# Patient Record
Sex: FEMALE | Race: WHITE | Employment: OTHER | URBAN - METROPOLITAN AREA
[De-identification: names, ages, dates, MRNs, and addresses within clinical notes are randomized per-mention and may not be internally consistent; named-entity substitution may affect disease eponyms.]

---

## 2017-08-02 ENCOUNTER — HOSPITAL ENCOUNTER (OUTPATIENT)
Dept: MAMMOGRAPHY | Age: 67
Discharge: HOME | End: 2017-08-02
Attending: FAMILY MEDICINE | Admitting: FAMILY MEDICINE

## 2017-08-02 DIAGNOSIS — Z12.31 VISIT FOR SCREENING MAMMOGRAM: ICD-10-CM

## 2018-05-16 ENCOUNTER — HOSPITAL ENCOUNTER (OUTPATIENT)
Dept: MAMMOGRAPHY | Age: 68
Discharge: HOME | End: 2018-05-16
Admitting: OBSTETRICS & GYNECOLOGY

## 2018-05-16 DIAGNOSIS — N64.4 MASTODYNIA: ICD-10-CM

## 2018-05-16 DIAGNOSIS — Z12.31 VISIT FOR SCREENING MAMMOGRAM: ICD-10-CM

## 2018-08-17 ENCOUNTER — HOSPITAL ENCOUNTER (EMERGENCY)
Age: 68
Discharge: HOME | End: 2018-08-17
Attending: EMERGENCY MEDICINE
Payer: MEDICARE

## 2018-08-17 VITALS
BODY MASS INDEX: 28.45 KG/M2 | HEIGHT: 66 IN | HEART RATE: 69 BPM | SYSTOLIC BLOOD PRESSURE: 153 MMHG | WEIGHT: 177 LBS | DIASTOLIC BLOOD PRESSURE: 85 MMHG | OXYGEN SATURATION: 96 % | TEMPERATURE: 98.2 F | RESPIRATION RATE: 17 BRPM

## 2018-08-17 DIAGNOSIS — T63.441A ALLERGIC REACTION TO BEE STING: Primary | ICD-10-CM

## 2018-08-17 PROCEDURE — 96361 HYDRATE IV INFUSION ADD-ON: CPT

## 2018-08-17 PROCEDURE — 96375 TX/PRO/DX INJ NEW DRUG ADDON: CPT

## 2018-08-17 PROCEDURE — 96374 THER/PROPH/DIAG INJ IV PUSH: CPT

## 2018-08-17 PROCEDURE — 6360000002 HC RX W HCPCS: Performed by: PHYSICIAN ASSISTANT

## 2018-08-17 PROCEDURE — 99282 EMERGENCY DEPT VISIT SF MDM: CPT

## 2018-08-17 PROCEDURE — S0028 INJECTION, FAMOTIDINE, 20 MG: HCPCS | Performed by: PHYSICIAN ASSISTANT

## 2018-08-17 PROCEDURE — 2500000003 HC RX 250 WO HCPCS: Performed by: PHYSICIAN ASSISTANT

## 2018-08-17 PROCEDURE — 2580000003 HC RX 258: Performed by: PHYSICIAN ASSISTANT

## 2018-08-17 RX ORDER — PREDNISONE 10 MG/1
TABLET ORAL
Qty: 20 TABLET | Refills: 0 | Status: SHIPPED | OUTPATIENT
Start: 2018-08-17 | End: 2018-08-27

## 2018-08-17 RX ORDER — FLUTICASONE PROPIONATE 50 MCG
1 SPRAY, SUSPENSION (ML) NASAL DAILY PRN
COMMUNITY
Start: 2018-06-05

## 2018-08-17 RX ORDER — DIPHENHYDRAMINE HCL 25 MG
25 CAPSULE ORAL EVERY 6 HOURS PRN
Qty: 20 CAPSULE | Refills: 0 | Status: SHIPPED | OUTPATIENT
Start: 2018-08-17 | End: 2018-08-22

## 2018-08-17 RX ORDER — METHYLPREDNISOLONE SODIUM SUCCINATE 125 MG/2ML
125 INJECTION, POWDER, LYOPHILIZED, FOR SOLUTION INTRAMUSCULAR; INTRAVENOUS ONCE
Status: COMPLETED | OUTPATIENT
Start: 2018-08-17 | End: 2018-08-17

## 2018-08-17 RX ORDER — 0.9 % SODIUM CHLORIDE 0.9 %
1000 INTRAVENOUS SOLUTION INTRAVENOUS ONCE
Status: COMPLETED | OUTPATIENT
Start: 2018-08-17 | End: 2018-08-17

## 2018-08-17 RX ORDER — LORATADINE 10 MG/1
1 CAPSULE, LIQUID FILLED ORAL DAILY
COMMUNITY

## 2018-08-17 RX ORDER — EPINEPHRINE 0.3 MG/.3ML
0.3 INJECTION SUBCUTANEOUS ONCE
Qty: 1 EACH | Refills: 0 | Status: SHIPPED | OUTPATIENT
Start: 2018-08-17 | End: 2021-09-21 | Stop reason: SDUPTHER

## 2018-08-17 RX ORDER — EPINEPHRINE 0.3 MG/.3ML
1 INJECTION SUBCUTANEOUS PRN
COMMUNITY
Start: 2017-06-03 | End: 2018-08-17

## 2018-08-17 RX ORDER — DIPHENHYDRAMINE HYDROCHLORIDE 50 MG/ML
25 INJECTION INTRAMUSCULAR; INTRAVENOUS ONCE
Status: COMPLETED | OUTPATIENT
Start: 2018-08-17 | End: 2018-08-17

## 2018-08-17 RX ORDER — FAMOTIDINE 20 MG/1
40 TABLET, FILM COATED ORAL DAILY
Qty: 10 TABLET | Refills: 0 | Status: SHIPPED | OUTPATIENT
Start: 2018-08-17 | End: 2018-08-22

## 2018-08-17 RX ORDER — ESTRADIOL 0.1 MG/G
2 CREAM VAGINAL NIGHTLY
COMMUNITY

## 2018-08-17 RX ADMIN — METHYLPREDNISOLONE SODIUM SUCCINATE 125 MG: 125 INJECTION, POWDER, FOR SOLUTION INTRAMUSCULAR; INTRAVENOUS at 15:44

## 2018-08-17 RX ADMIN — DIPHENHYDRAMINE HYDROCHLORIDE 25 MG: 50 INJECTION, SOLUTION INTRAMUSCULAR; INTRAVENOUS at 16:00

## 2018-08-17 RX ADMIN — SODIUM CHLORIDE 1000 ML: 9 INJECTION, SOLUTION INTRAVENOUS at 15:45

## 2018-08-17 RX ADMIN — FAMOTIDINE 20 MG: 10 INJECTION, SOLUTION INTRAVENOUS at 15:44

## 2018-08-17 ASSESSMENT — PAIN DESCRIPTION - LOCATION: LOCATION: KNEE;WRIST

## 2018-08-17 ASSESSMENT — ENCOUNTER SYMPTOMS
SHORTNESS OF BREATH: 0
DIARRHEA: 0
NAUSEA: 0
CHEST TIGHTNESS: 0
VOMITING: 0
ABDOMINAL PAIN: 0
WHEEZING: 0

## 2018-08-17 ASSESSMENT — PAIN SCALES - GENERAL: PAINLEVEL_OUTOF10: 8

## 2018-08-17 ASSESSMENT — PAIN DESCRIPTION - PAIN TYPE: TYPE: ACUTE PAIN

## 2018-08-17 ASSESSMENT — PAIN DESCRIPTION - ORIENTATION: ORIENTATION: POSTERIOR

## 2018-08-17 NOTE — ED PROVIDER NOTES
.    [facility]  EMERGENCY DEPARTMENT ENCOUNTER          PHYSICIAN ASSISTANT NOTE       Date of evaluation: 8/17/2018    Chief Complaint     Allergic Reaction (Too Bees, took epi and benadryl prior to arrival. )      History of Present Illness     SHAVON Rodriguez is a 75 y.o. female who presents to the emergency department for Allergic reaction. This afternoon at 2:45 she was stung by 2 bees, once on her left forearm, and once on the back of her left leg. 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 She took her EpiPen, as well as one pill of 25 mg of Benadryl. She developed localized swelling on her arm and leg, with no shortness of breath, no swelling of her mouth or tongue, no shortness of breath or chest pain, no nausea, vomiting or abdominal discomfort. Upon presentation to the ED, she admits to slight lightheadedness without dizziness. Lightheadedness is not positional.  Symptoms were gradual in onset. Review of Systems     Review of Systems   Constitutional: Negative for chills, diaphoresis, fatigue and fever. Respiratory: Negative for chest tightness, shortness of breath and wheezing. Gastrointestinal: Negative for abdominal pain, diarrhea, nausea and vomiting. Skin: Negative for pallor and rash. Neurological: Positive for light-headedness. Negative for dizziness, weakness and headaches. Past Medical, Surgical, Family, and Social History     She has a past medical history of Hypertension. She has a past surgical history that includes partial hysterectomy (cervix not removed); Appendectomy; and Finger trigger release. Her family history is not on file. She reports that she has never smoked. She has never used smokeless tobacco. She reports that she drinks alcohol. She reports that she does not use drugs.     Medications     Previous Medications    ATENOLOL (TENORMIN) 25 MG TABLET    Take 25 mg by mouth daily    CALCIUM CARBONATE (OSCAL) 500 MG TABS TABLET Take 500 mg by mouth daily    ESTRADIOL (ESTRACE) 0.1 MG/GM VAGINAL CREAM    Place 2 g vaginally nightly    FLUTICASONE (FLONASE) 50 MCG/ACT NASAL SPRAY    1 spray by Nasal route daily as needed    HYDROCORTISONE 2.5 % CREAM    2 times daily as needed    LORATADINE (CLARITIN) 10 MG CAPSULE    Take 1 capsule by mouth daily    VITAMIN D (CHOLECALCIFEROL) 1000 UNIT TABS TABLET    Take 1,000 Units by mouth daily    VITAMIN D 1000 UNITS CAPS    Take 1 capsule by mouth daily       Allergies     She is allergic to bee venom; fexofenadine; ibuprofen; metronidazole; shellfish-derived products; anesthetics, amide; and macrolides and ketolides. Physical Exam     INITIAL VITALS: BP: (!) 147/78, Temp: 98.2 °F (36.8 °C), Pulse: 81, Resp: 18, SpO2: 100 %   Physical Exam   Constitutional: She appears well-developed and well-nourished. No distress. HENT:   Head: Normocephalic and atraumatic. Mouth/Throat: Oropharynx is clear and moist.   No swelling or lips or tongue, uvula is midline and not enlarged   Eyes: Pupils are equal, round, and reactive to light. EOM are normal.   Neck: Normal range of motion. Cardiovascular: Normal rate, regular rhythm, normal heart sounds and intact distal pulses. Exam reveals no gallop and no friction rub. No murmur heard. Pulmonary/Chest: Effort normal and breath sounds normal. No respiratory distress. She has no wheezes. Abdominal: Soft. There is no tenderness. Musculoskeletal: Normal range of motion. She exhibits no edema. Neurological: She is alert. Skin: Skin is warm and dry. She is not diaphoretic. Psychiatric: She has a normal mood and affect. Her behavior is normal.   Nursing note and vitals reviewed. Diagnostic Results     RADIOLOGY:  No orders to display       LABS:   No results found for this visit on 08/17/18.       RECENT VITALS:  BP: (!) 150/74, Temp: 98.2 °F (36.8 °C), Pulse: 68, Resp: 14, SpO2: 98 %     Procedures     None    ED Course     Nursing Notes, Past Medical Hx, Past Surgical Hx, Social Hx, Allergies, and Family Hx were reviewed. The patient was given the following medications:  Orders Placed This Encounter   Medications    methylPREDNISolone sodium (SOLU-MEDROL) injection 125 mg    famotidine (PEPCID) injection 20 mg    0.9 % sodium chloride bolus    diphenhydrAMINE (BENADRYL) injection 25 mg    EPINEPHrine (EPIPEN 2-ERASMO) 0.3 MG/0.3ML SOAJ injection     Sig: Inject 0.3 mLs into the muscle once for 1 dose     Dispense:  1 each     Refill:  0     May supplement with generic brand    predniSONE (DELTASONE) 10 MG tablet     Sig: Take 4 tablets by mouth once daily for 5 days     Dispense:  20 tablet     Refill:  0    famotidine (PEPCID) 20 MG tablet     Sig: Take 2 tablets by mouth daily for 5 days     Dispense:  10 tablet     Refill:  0    diphenhydrAMINE (BENADRYL) 25 MG capsule     Sig: Take 1 capsule by mouth every 6 hours as needed for Itching     Dispense:  20 capsule     Refill:  0       CONSULTS:  None    MEDICAL DECISION MAKING / ASSESSMENT / Claude Burrows is a 75 y.o. female presenting to the emergency department for evaluation after being stung by a bee. 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 Bee sting was at 245, she subsequently developed mild localized swelling and erythema on her left forearm and the back of her left leg. She took her EpiPen as well as one dose of 25 mg of Benadryl. Upon presentation, she has no swelling of her mouth or tongue. She has no airway compromise. She did admit to a small amount of lightheadedness, but remained hemodynamically stable and afebrile. She was given a 1 L fluid bolus as well as IV Pepcid and methylprednisolone. She was also given 25 mg of Benadryl IV. Plan to monitor patient for developing symptoms until 17:30.  At this time, I'm going off service, my attending will follow-up on further treatment of symptoms with likely discharge home if patient remains

## 2018-08-17 NOTE — ED PROVIDER NOTES
ED Attending Attestation Note     Date of evaluation: 8/17/2018    This patient was seen by the advance practice provider. I have seen and examined the patient, agree with the workup, evaluation, management and diagnosis. The care plan has been discussed. My assessment reveals a 75 YOF with a PMHx of HTN and bee-sting allergy who presented after being stung by a presumed bee at approximately 2:45 PM on her left wrist and behind her left knee. She used her EPI pen and took benadryl and came to the ED. On my evaluation she is well appearing with clear lungs, no stridor, and not hypotensive. Her abdomen is benign. She has a GCS of 15. Will treat with Pepcid, Diphenhydramine, Solumedrol and IVF. Will observe in the ED for several hours and refill EPI pen. No evidence of retained stingers.         Lexii Zimmerman MD  08/17/18 [id number]

## 2019-05-22 ENCOUNTER — HOSPITAL ENCOUNTER (OUTPATIENT)
Dept: MAMMOGRAPHY | Age: 69
Discharge: HOME | End: 2019-05-22
Payer: MEDICARE

## 2019-05-22 DIAGNOSIS — Z12.31 VISIT FOR SCREENING MAMMOGRAM: ICD-10-CM

## 2019-05-22 PROCEDURE — 77067 SCR MAMMO BI INCL CAD: CPT

## 2019-05-22 PROCEDURE — 77063 BREAST TOMOSYNTHESIS BI: CPT

## 2021-05-08 ENCOUNTER — HOSPITAL ENCOUNTER (EMERGENCY)
Age: 71
Discharge: HOME | End: 2021-05-08
Attending: STUDENT IN AN ORGANIZED HEALTH CARE EDUCATION/TRAINING PROGRAM
Payer: MEDICARE

## 2021-05-08 VITALS
TEMPERATURE: 98.7 F | OXYGEN SATURATION: 100 % | SYSTOLIC BLOOD PRESSURE: 175 MMHG | DIASTOLIC BLOOD PRESSURE: 85 MMHG | HEART RATE: 86 BPM | RESPIRATION RATE: 18 BRPM

## 2021-05-08 DIAGNOSIS — T63.441A BEE STING, ACCIDENTAL OR UNINTENTIONAL, INITIAL ENCOUNTER: Primary | ICD-10-CM

## 2021-05-08 PROCEDURE — 6360000002 HC RX W HCPCS: Performed by: STUDENT IN AN ORGANIZED HEALTH CARE EDUCATION/TRAINING PROGRAM

## 2021-05-08 PROCEDURE — 2580000003 HC RX 258: Performed by: STUDENT IN AN ORGANIZED HEALTH CARE EDUCATION/TRAINING PROGRAM

## 2021-05-08 PROCEDURE — 96374 THER/PROPH/DIAG INJ IV PUSH: CPT

## 2021-05-08 PROCEDURE — 2500000003 HC RX 250 WO HCPCS: Performed by: STUDENT IN AN ORGANIZED HEALTH CARE EDUCATION/TRAINING PROGRAM

## 2021-05-08 PROCEDURE — 96375 TX/PRO/DX INJ NEW DRUG ADDON: CPT

## 2021-05-08 PROCEDURE — 99283 EMERGENCY DEPT VISIT LOW MDM: CPT

## 2021-05-08 RX ORDER — METHYLPREDNISOLONE SODIUM SUCCINATE 125 MG/2ML
125 INJECTION, POWDER, LYOPHILIZED, FOR SOLUTION INTRAMUSCULAR; INTRAVENOUS ONCE
Status: COMPLETED | OUTPATIENT
Start: 2021-05-08 | End: 2021-05-08

## 2021-05-08 RX ORDER — 0.9 % SODIUM CHLORIDE 0.9 %
500 INTRAVENOUS SOLUTION INTRAVENOUS ONCE
Status: COMPLETED | OUTPATIENT
Start: 2021-05-08 | End: 2021-05-08

## 2021-05-08 RX ADMIN — METHYLPREDNISOLONE SODIUM SUCCINATE 125 MG: 125 INJECTION, POWDER, FOR SOLUTION INTRAMUSCULAR; INTRAVENOUS at 19:13

## 2021-05-08 RX ADMIN — SODIUM CHLORIDE 500 ML: 900 INJECTION, SOLUTION INTRAVENOUS at 19:13

## 2021-05-08 RX ADMIN — FAMOTIDINE 20 MG: 10 INJECTION, SOLUTION INTRAVENOUS at 19:13

## 2021-05-08 ASSESSMENT — ENCOUNTER SYMPTOMS
NAUSEA: 0
SHORTNESS OF BREATH: 0
ABDOMINAL PAIN: 0
RHINORRHEA: 0
COUGH: 0
EYE DISCHARGE: 0
COLOR CHANGE: 1
EYE REDNESS: 0
SORE THROAT: 0
VOMITING: 0

## 2021-05-08 NOTE — ED PROVIDER NOTES
[address]          ATTENDING PHYSICIAN NOTE       Date of evaluation: 5/8/2021    Chief Complaint     Allergic Reaction (bee sting)      History of Present Illness     ELISSA Coles is a 69 y.o. female with hx of bee allergy presents to the ED for a bee sting to the left arm. It occurred approximately 45 minutes prior to arrival.  Patient administered her Epipen and took 50 mg of PO benadryl prior to arrival.  She denies shortness of breath, chest pain, abd pain, n/v, diarrhea. She says she \"feels flushed. \"    Review of Systems     Review of Systems   Constitutional: Negative for chills and fever. HENT: Negative for rhinorrhea and sore throat. Eyes: Negative for discharge and redness. Respiratory: Negative for cough and shortness of breath. Cardiovascular: Negative for chest pain and leg swelling. Gastrointestinal: Negative for abdominal pain, nausea and vomiting. Genitourinary: Negative for dysuria and hematuria. Musculoskeletal: Negative for arthralgias and myalgias. Skin: Positive for color change. Negative for rash. Neurological: Negative for light-headedness and headaches. Psychiatric/Behavioral: Negative for agitation and confusion. Past Medical, Surgical, Family, and Social History     She has a past medical history of Cancer [facility]), Hypertension, and Kidney stone. She has a past surgical history that includes partial hysterectomy (cervix not removed); Appendectomy; and Finger trigger release. Her family history is not on file. She reports that she has never smoked. She has never used smokeless tobacco. She reports current alcohol use. She reports that she does not use drugs.     Medications     Previous Medications    ATENOLOL (TENORMIN) 25 MG TABLET    Take 25 mg by mouth daily    CALCIUM CARBONATE (OSCAL) 500 MG TABS TABLET    Take 500 mg by mouth daily    EPINEPHRINE (EPIPEN 2-ERASMO) 0.3 MG/0.3ML SOAJ INJECTION    Inject 0.3 LABS:   No results found for this visit on 05/08/21. RECENT VITALS:  BP: (!) 175/85,Temp: 98.7 °F (37.1 °C), Pulse: 86, Resp: 18, SpO2: 100 %       ED Course     Nursing Notes, Past Medical Hx, Past Surgical Hx, Social Hx,Allergies, and Family Hx were reviewed. patient was given the following medications:  Orders Placed This Encounter   Medications    methylPREDNISolone sodium (SOLU-MEDROL) injection 125 mg    famotidine (PEPCID) injection 20 mg    0.9 % sodium chloride bolus       CONSULTS:  None    MEDICAL DECISIONMAKING / ASSESSMENT / Gio Jluis is a 69 y.o. female with hx of bee allergy presents to the ED after a bee sting. Patient administered Epipen and took 50 mg PO benadryl prior to arrival.  ABCs intact, mildly hypertensive 175/85 other vitals stable. Patient in no wale distress. No tongue swelling, stridor, or concern for airway compromise. IV access established and patient given IV pepcid, solumedrol, and fluid bolus. She was observed in the ED for 2 hours with improvement of BP. At that time, patient says her symptoms had improved and she requested discharge home. ED precautions for worsening symptoms. Clinical Impression     1.  Bee sting, accidental or unintentional, initial encounter        Disposition     PATIENT REFERRED TO:  [id number] [facility],[address] [phone]    Schedule an appointment as soon as possible for a visit       [facility]  [address] [place] [address]  [phone]    If symptoms worsen      DISCHARGE MEDICATIONS:  New Prescriptions    No medications on file       DISPOSITION Decision To Discharge 05/08/2021 08:41:05 PM       Annabella Kenney MD  05/08/21 [id number]

## 2021-05-09 NOTE — ED NOTES
Patient discharged to home via family. Written discharge instructions reviewed with understanding. Copy of AVS sent home with patient. Patient able to walk from ED without assistance.        Nazanin Rose RN  05/08/21 3651

## 2021-09-21 ENCOUNTER — HOSPITAL ENCOUNTER (EMERGENCY)
Age: 71
Discharge: HOME | End: 2021-09-21
Attending: STUDENT IN AN ORGANIZED HEALTH CARE EDUCATION/TRAINING PROGRAM
Payer: MEDICARE

## 2021-09-21 VITALS
WEIGHT: 179 LBS | SYSTOLIC BLOOD PRESSURE: 166 MMHG | HEIGHT: 66 IN | OXYGEN SATURATION: 98 % | TEMPERATURE: 97.5 F | BODY MASS INDEX: 28.77 KG/M2 | RESPIRATION RATE: 13 BRPM | HEART RATE: 77 BPM | DIASTOLIC BLOOD PRESSURE: 94 MMHG

## 2021-09-21 DIAGNOSIS — T78.40XA ALLERGIC REACTION, INITIAL ENCOUNTER: Primary | ICD-10-CM

## 2021-09-21 PROCEDURE — 99283 EMERGENCY DEPT VISIT LOW MDM: CPT

## 2021-09-21 PROCEDURE — 6370000000 HC RX 637 (ALT 250 FOR IP): Performed by: STUDENT IN AN ORGANIZED HEALTH CARE EDUCATION/TRAINING PROGRAM

## 2021-09-21 RX ORDER — PREDNISONE 20 MG/1
20 TABLET ORAL DAILY
Qty: 4 TABLET | Refills: 0 | Status: SHIPPED | OUTPATIENT
Start: 2021-09-21 | End: 2021-09-25

## 2021-09-21 RX ORDER — FAMOTIDINE 20 MG/1
20 TABLET, FILM COATED ORAL ONCE
Status: COMPLETED | OUTPATIENT
Start: 2021-09-21 | End: 2021-09-21

## 2021-09-21 RX ORDER — DIPHENHYDRAMINE HCL 25 MG
25 CAPSULE ORAL EVERY 6 HOURS PRN
Qty: 1 CAPSULE | Refills: 0 | Status: SHIPPED | OUTPATIENT
Start: 2021-09-21 | End: 2021-10-01

## 2021-09-21 RX ORDER — DIPHENHYDRAMINE HCL 25 MG
25 TABLET ORAL ONCE
Status: COMPLETED | OUTPATIENT
Start: 2021-09-21 | End: 2021-09-21

## 2021-09-21 RX ORDER — EPINEPHRINE 0.3 MG/.3ML
0.3 INJECTION SUBCUTANEOUS ONCE
Qty: 1 EACH | Refills: 0 | Status: SHIPPED | OUTPATIENT
Start: 2021-09-21 | End: 2021-09-21

## 2021-09-21 RX ORDER — PREDNISONE 20 MG/1
40 TABLET ORAL ONCE
Status: COMPLETED | OUTPATIENT
Start: 2021-09-21 | End: 2021-09-21

## 2021-09-21 RX ADMIN — PREDNISONE 40 MG: 20 TABLET ORAL at 12:52

## 2021-09-21 RX ADMIN — FAMOTIDINE 20 MG: 20 TABLET ORAL at 12:52

## 2021-09-21 RX ADMIN — DIPHENHYDRAMINE HYDROCHLORIDE 25 MG: 25 TABLET, FILM COATED ORAL at 16:20

## 2021-09-21 ASSESSMENT — PAIN DESCRIPTION - ORIENTATION: ORIENTATION: LEFT

## 2021-09-21 ASSESSMENT — PAIN DESCRIPTION - PAIN TYPE: TYPE: ACUTE PAIN

## 2021-09-21 ASSESSMENT — PAIN DESCRIPTION - DESCRIPTORS: DESCRIPTORS: BURNING

## 2021-09-21 ASSESSMENT — PAIN SCALES - GENERAL: PAINLEVEL_OUTOF10: 6

## 2021-09-21 NOTE — ED PROVIDER NOTES
swelling, joint redness    Past Medical, Surgical, Family, and Social History     She has a past medical history of Cancer ([address] Hypertension, and Kidney stone. She has a past surgical history that includes partial hysterectomy (cervix not removed); Appendectomy; and Finger trigger release. Her family history is not on file. She reports that she has never smoked. She has never used smokeless tobacco. She reports current alcohol use. She reports that she does not use drugs. Medications     Discharge Medication List as of 9/21/2021  4:13 PM      CONTINUE these medications which have NOT CHANGED    Details   vitamin D 1000 units CAPS Take 1 capsule by mouth dailyHistorical Med      estradiol (ESTRACE) 0.1 MG/GM vaginal cream Place 2 g vaginally nightlyHistorical Med      fluticasone (FLONASE) 50 MCG/ACT nasal spray 1 spray by Nasal route daily as neededHistorical Med      hydrocortisone 2.5 % cream 2 times daily as needed, 2 TIMES DAILY PRN Starting Tue 6/5/2018, Historical Med      loratadine (CLARITIN) 10 MG capsule Take 1 capsule by mouth dailyHistorical Med      famotidine (PEPCID) 20 MG tablet Take 2 tablets by mouth daily for 5 days, Disp-10 tablet, R-0Print      atenolol (TENORMIN) 25 MG tablet Take 25 mg by mouth daily      vitamin D (CHOLECALCIFEROL) 1000 UNIT TABS tablet Take 1,000 Units by mouth daily      calcium carbonate (OSCAL) 500 MG TABS tablet Take 500 mg by mouth daily             Allergies     She is allergic to bee venom; fexofenadine; ibuprofen; metronidazole; shellfish-derived products; anesthetics, amide; and macrolides and ketolides. Physical Exam     INITIAL VITALS: BP: (!) 160/83, Temp: 97.5 °F (36.4 °C), Pulse: 100, Resp: 14, SpO2: 99 %     General:  Well appearing. No acute distress. Non-toxic appearing    Eyes:  Pupils equally round, reactive, brisk. No discharge from eyes.    ENT: The external ears are normal.   The external nose is unremarkable, and there is no significant nasal or ear drainage on exam. The mucosa are moist. The uvula is midline, elevates in the midline, and is free of edema. There are no tonsillar exudates. The submandibular space is soft and free of significant swelling. There is no drooling, dysphonia, pooled secretions, stridor, or active bleeding. Neck:  Supple. Nontender. Pulmonary:   Non-labored breathing. Breath sounds clear bilaterally. No wheezing. Cardiac:  Regular rhythm, normal rate (80s on my exam). No murmurs. Abdomen:  Soft. Non-tender throughout. Non-distended. No masses. Musculoskeletal:  No long bone deformity. No ankle or wrist deformity. Vascular:  Extremities warm and perfused. Radial pulses 2+ bilaterally. Dorsalis pedis pulses 2+ bilaterally. Skin:  No rash. Warm. Bee sting just inferior to L breast with 2cm circular area of raised erythema. At left posterior back, scattered mild wheals. Neuro: Alert and oriented x 3. CN II-XII grossly intact. Speech and mentation normal.   HOLLIE. Sensation grossly intact to light touch. Extremities:  No peripheral edema. LE symmetric. Diagnostic Results     EKG   none    RADIOLOGY:  No orders to display       LABS:   No results found for this visit on 09/21/21. ED BEDSIDE ULTRASOUND:  None performed    Procedures     None performed    ED Course     Nursing Notes, Past Medical Hx, Past Surgical Hx, Social Hx, Allergies, and Family Hx were reviewed.     The patient was given the following medications:  Orders Placed This Encounter   Medications    predniSONE (DELTASONE) tablet 40 mg    famotidine (PEPCID) tablet 20 mg    predniSONE (DELTASONE) 20 MG tablet     Sig: Take 1 tablet by mouth daily for 4 days     Dispense:  4 tablet     Refill:  0    diphenhydrAMINE (BENADRYL) 25 MG capsule     Sig: Take 1 capsule by mouth every 6 hours as needed for Itching     Dispense:  1 capsule     Refill:  0    EPINEPHrine (EPIPEN 2-ERASMO) 0.3 MG/0.3ML SOAJ injection     Sig: Inject 0.3 mLs into the muscle once for 1 dose     Dispense:  1 each     Refill:  0     May supplement with generic brand    diphenhydrAMINE (BENADRYL) tablet 25 mg       CONSULTS:  None    MEDICAL DECISIONMAKING / ASSESSMENT / Enrique Sparrow is a 69 y.o. female with allergic reaction s/p epi pen. Pt was hemodynamically stable and afebrile in the Emergency Department. On initial exam, she did have rash and had reported throat swelling at home so I did consider this to be possibly anaphylaxis or at least anaphylactoid. However, her exam here is almost completely reassuring with only a faint rash. Her oropharynx is without any swelling whatsoever. She has no hypotension. She is able to take pills. We will monitor her for 4 hours. On reassessment reveal is free of edema, there is no wheezing, her belly is soft and benign. There is been no nausea or vomiting. She has tolerated her medications and without difficulty. There is been no hypotension or tachycardia. We will refill her EpiPen, give her a short course of steroids, encourage Benadryl. Clinical Impression     1. Allergic reaction, initial encounter        Disposition     PATIENT REFERRED TO:  No follow-up provider specified.     DISCHARGE MEDICATIONS:  Discharge Medication List as of 9/21/2021  4:13 PM      START taking these medications    Details   predniSONE (DELTASONE) 20 MG tablet Take 1 tablet by mouth daily for 4 days, Disp-4 tablet, R-0Print      diphenhydrAMINE (BENADRYL) 25 MG capsule Take 1 capsule by mouth every 6 hours as needed for Itching, Disp-1 capsule, R-0Print             DISPOSITION Decision To Discharge 09/21/2021 04:22:32 PM       Carlee Gann MD  09/21/21 8732

## 2022-08-05 ENCOUNTER — HOSPITAL ENCOUNTER (OUTPATIENT)
Dept: MAMMOGRAPHY | Age: 72
Discharge: HOME | End: 2022-08-05
Payer: MEDICARE

## 2022-08-05 VITALS — BODY MASS INDEX: 29.41 KG/M2 | WEIGHT: 183 LBS | HEIGHT: 66 IN

## 2022-08-05 DIAGNOSIS — Z12.31 VISIT FOR SCREENING MAMMOGRAM: ICD-10-CM

## 2022-08-05 PROCEDURE — 77063 BREAST TOMOSYNTHESIS BI: CPT

## 2023-07-10 NOTE — ED NOTES
Bed: B20-20  Expected date:   Expected time:   Means of arrival:   Comments:  [address], RN  09/21/21 [id number] 65 year old female PMH HTN, Mitral Valve Prolapse, IBS, Prediabetes, Anxiety, Emphysema, COPD, Neck Pain, Migraines, Acid Reflux, Hiatal Hernia, Arnie's Esophagus, Other Specified Conditions Associated With Female Genital organs & Menstrual Cycle; now with Traumatic Rupture LEFT Ulnar Collateral Ligament Initial Encounter; presents today for PST prior to Repair LEFT Index Right Radial Collateral Ligament Rupture Utilizing Fluoroscopy with Dr Claudio Rosales on 7/20/23.     Pt notes "Back in January on the 30th I was moving fried chicken out of the frying pan onto the plate and I guess the plate got heavy and I ruptured my LEFT Index Ligament." Pt notes she went to her primary a few days later and had an xray which she notes didn't really show anything. Pt notes she had  continued discomfort and swelling LEFT Index finger knuckle area. Pt notes she first went to [facility] and was told she "had a bad sprain and I got a Cortisone shot." Pt then continued with discomfort and went to a different doctor in that practice who sent her for an MRI. Pt got DX but was told they "usually don't do surgery on those things they will heal on their own." Pt notes  pain continued so she went for consult with Dr Rosales. pt had follow up MRI which she notes shows rupture. Rates pain #9/10 on pain scale. Notes decreased ROM , decreased ability to bend fist, decreased ability to grab and lift things. Following discussions with Dr Rosales regarding treatment options pt is electing for scheduled procedure.  65 year old female PMH HTN, Mitral Valve Prolapse, IBS, Prediabetes, Anxiety, Emphysema, COPD, Neck Pain, Migraines, Acid Reflux, Hiatal Hernia, Arnie's Esophagus, Other Specified Conditions Associated With Female Genital organs & Menstrual Cycle; now with Traumatic Rupture LEFT Ulnar Collateral Ligament Initial Encounter; presents today for PST prior to Repair LEFT Index Right Radial Collateral Ligament Rupture Utilizing Fluoroscopy with Dr Claudio Rosales on 7/20/23.     Pt notes "Back in January on the 30th I was moving fried chicken out of the frying pan onto the plate and I guess the plate got heavy and I ruptured my LEFT Index Ligament." Pt notes she went to her primary a few days later and had an xray which she notes didn't really show anything. Pt notes she had  continued discomfort and swelling LEFT Index finger knuckle area. Pt notes she first went to [facility] and was told she "had a bad sprain and I got a Cortisone shot." Pt then continued with discomfort and went to a different doctor in that practice who sent her for an MRI. Pt got DX but was told they "usually don't do surgery on those things they will heal on their own." Pt notes  pain continued so she went for consult with Dr Rosales. Pt had follow up MRI which she notes shows rupture. Rates pain #9/10 on pain scale. Notes decreased ROM , decreased ability to bend fist, decreased ability to grab and lift things. Following discussions with Dr Rosales regarding treatment options pt is electing for scheduled procedure.

## 2023-09-18 ENCOUNTER — HOSPITAL ENCOUNTER (OUTPATIENT)
Dept: MAMMOGRAPHY | Age: 73
Discharge: HOME | End: 2023-09-18
Payer: MEDICARE

## 2023-09-18 VITALS — HEIGHT: 66 IN | BODY MASS INDEX: 29.41 KG/M2 | WEIGHT: 183 LBS

## 2023-09-18 DIAGNOSIS — Z12.31 VISIT FOR SCREENING MAMMOGRAM: ICD-10-CM

## 2023-09-18 PROCEDURE — 77063 BREAST TOMOSYNTHESIS BI: CPT

## 2024-08-10 ENCOUNTER — HOSPITAL ENCOUNTER (EMERGENCY)
Age: 74
Discharge: HOME | End: 2024-08-10
Attending: EMERGENCY MEDICINE
Payer: MEDICARE

## 2024-08-10 VITALS
RESPIRATION RATE: 18 BRPM | HEIGHT: 65 IN | DIASTOLIC BLOOD PRESSURE: 98 MMHG | OXYGEN SATURATION: 98 % | TEMPERATURE: 97.8 F | HEART RATE: 70 BPM | BODY MASS INDEX: 30.16 KG/M2 | WEIGHT: 181 LBS | SYSTOLIC BLOOD PRESSURE: 169 MMHG

## 2024-08-10 DIAGNOSIS — T78.40XA ALLERGIC REACTION, INITIAL ENCOUNTER: Primary | ICD-10-CM

## 2024-08-10 PROCEDURE — 6370000000 HC RX 637 (ALT 250 FOR IP)

## 2024-08-10 PROCEDURE — 96372 THER/PROPH/DIAG INJ SC/IM: CPT

## 2024-08-10 PROCEDURE — 6360000002 HC RX W HCPCS

## 2024-08-10 PROCEDURE — 99284 EMERGENCY DEPT VISIT MOD MDM: CPT

## 2024-08-10 RX ORDER — PREDNISONE 20 MG/1
60 TABLET ORAL ONCE
Status: COMPLETED | OUTPATIENT
Start: 2024-08-10 | End: 2024-08-10

## 2024-08-10 RX ORDER — ASPIRIN 81 MG/1
81 TABLET, CHEWABLE ORAL DAILY
COMMUNITY

## 2024-08-10 RX ORDER — CETIRIZINE HYDROCHLORIDE 10 MG/1
10 TABLET ORAL PRN
COMMUNITY

## 2024-08-10 RX ORDER — METHYLPREDNISOLONE 4 MG/1
TABLET ORAL
Qty: 1 KIT | Refills: 0 | Status: SHIPPED | OUTPATIENT
Start: 2024-08-10 | End: 2024-08-16

## 2024-08-10 RX ORDER — IBUPROFEN 200 MG
200 TABLET ORAL EVERY 6 HOURS PRN
COMMUNITY

## 2024-08-10 RX ORDER — OMEPRAZOLE 10 MG/1
10 CAPSULE, DELAYED RELEASE ORAL DAILY
COMMUNITY

## 2024-08-10 RX ORDER — KETOTIFEN FUMARATE 0.35 MG/ML
1 SOLUTION/ DROPS OPHTHALMIC 2 TIMES DAILY
COMMUNITY

## 2024-08-10 RX ORDER — DIPHENHYDRAMINE HCL 25 MG
25 TABLET ORAL EVERY 6 HOURS PRN
COMMUNITY

## 2024-08-10 RX ORDER — EPINEPHRINE 1 MG/ML
0.3 INJECTION, SOLUTION INTRAMUSCULAR; SUBCUTANEOUS ONCE
Status: COMPLETED | OUTPATIENT
Start: 2024-08-10 | End: 2024-08-10

## 2024-08-10 RX ADMIN — EPINEPHRINE 0.3 MG: 1 INJECTION INTRAMUSCULAR; INTRAVENOUS; SUBCUTANEOUS at 16:47

## 2024-08-10 RX ADMIN — PREDNISONE 60 MG: 20 TABLET ORAL at 16:46

## 2024-08-10 ASSESSMENT — PAIN DESCRIPTION - FREQUENCY: FREQUENCY: CONTINUOUS

## 2024-08-10 ASSESSMENT — ENCOUNTER SYMPTOMS
SORE THROAT: 1
WHEEZING: 0
SHORTNESS OF BREATH: 0
TROUBLE SWALLOWING: 0
GASTROINTESTINAL NEGATIVE: 1
CHEST TIGHTNESS: 0

## 2024-08-10 ASSESSMENT — PAIN DESCRIPTION - ONSET: ONSET: PROGRESSIVE

## 2024-08-10 ASSESSMENT — PAIN - FUNCTIONAL ASSESSMENT: PAIN_FUNCTIONAL_ASSESSMENT: 0-10

## 2024-08-10 ASSESSMENT — PAIN DESCRIPTION - PAIN TYPE: TYPE: ACUTE PAIN

## 2024-08-10 ASSESSMENT — PAIN SCALES - GENERAL: PAINLEVEL_OUTOF10: 5

## 2024-08-10 ASSESSMENT — PAIN DESCRIPTION - ORIENTATION: ORIENTATION: RIGHT

## 2024-08-10 ASSESSMENT — PAIN DESCRIPTION - DESCRIPTORS: DESCRIPTORS: DISCOMFORT;SORE;OTHER (COMMENT)

## 2024-08-10 ASSESSMENT — PAIN DESCRIPTION - LOCATION: LOCATION: HAND

## 2024-08-10 NOTE — ED PROVIDER NOTES
ED Attending Attestation Note     Date of evaluation: 8/10/2024    This patient was seen by the resident.  I have seen and examined the patient, agree with the workup, evaluation, management and diagnosis. The care plan has been discussed.  My assessment reveals patient with concern for development of anaphylaxis from a bee sting yesterday.  The patient has kept herself without symptoms with Benadryl and nonsedating antihistamines but today developed redness in the right hand and forearm where the sting occurred yesterday.  She developed some feeling in her throat and was concerned it may be a anaphylaxis but she did not take her EpiPen.  She was given epinephrine and steroid here with improvement of her symptoms and it was felt that she would need to continue the antihistamines and steroids.  She was observed for period of time and discharged home.     Teto Herr MD  08/10/24 [id number]

## 2024-08-10 NOTE — DISCHARGE INSTRUCTIONS
Please return to the ED if you feel like you're having difficulty breathing or if your reaction is getting worse.

## 2024-08-10 NOTE — ED PROVIDER NOTES
Partial hysterectomy; Appendectomy; Finger trigger release; Hysterectomy; and Ovary removal.  Her family history is not on file.  She reports that she has never smoked. She has never used smokeless tobacco. She reports current alcohol use. She reports that she does not use drugs.    Medications     Previous Medications    ASPIRIN 81 MG CHEWABLE TABLET    Take 1 tablet by mouth daily    ATENOLOL (TENORMIN) 25 MG TABLET    Take 1 tablet by mouth in the morning and 1 tablet in the evening.    CETIRIZINE (ZYRTEC) 10 MG TABLET    Take 1 tablet by mouth as needed for Allergies    DIPHENHYDRAMINE (BENADRYL) 25 MG TABLET    Take 1 tablet by mouth every 6 hours as needed for Itching    EPINEPHRINE (EPIPEN 2-ERASMO) 0.3 MG/0.3ML SOAJ INJECTION    Inject 0.3 mLs into the muscle once for 1 dose    FLUTICASONE (FLONASE) 50 MCG/ACT NASAL SPRAY    1 spray by Nasal route daily as needed    HYDROCORTISONE 2.5 % CREAM    2 times daily as needed    IBUPROFEN (ADVIL;MOTRIN) 200 MG TABLET    Take 1 tablet by mouth every 6 hours as needed for Pain ONLY GENERIC WITH NO COATING PER PT    KETOTIFEN FUMARATE (ZADITOR) 0.035 % OPHTHALMIC SOLUTION    Place 1 drop into both eyes 2 times daily    LORATADINE (CLARITIN) 10 MG CAPSULE    Take 1 capsule by mouth daily    OMEPRAZOLE (PRILOSEC) 10 MG DELAYED RELEASE CAPSULE    Take 1 capsule by mouth daily    PREDNISONE PO    Take by mouth as needed PRN BAD HEADACHES    VITAMIN D (CHOLECALCIFEROL) 1000 UNIT TABS TABLET    Take 1,000 Units by mouth daily    VITAMIN D 1000 UNITS CAPS    Take 1 capsule by mouth daily       Allergies     She is allergic to bee venom; fexofenadine; ibuprofen; metronidazole; shellfish-derived products; anesthetics, amide; and macrolides and ketolides.    Physical Exam     INITIAL VITALS: BP: (!) 169/98, Temp: 97.8 °F (36.6 °C), Pulse: 70, Respirations: 18, SpO2: 98 %   Physical Exam  Constitutional:       Appearance: Normal appearance.   Cardiovascular:      Rate and Rhythm:    EPINEPHrine (EPIPEN 2-ERASMO) 0.3 MG/0.3ML SOAJ injection Inject 0.3 mLs into the muscle once for 1 dose, Disp-1 each, R-0May supplement with generic brandPrint      vitamin D 1000 units CAPS Take 1 capsule by mouth dailyHistorical Med      hydrocortisone 2.5 % cream 2 times daily as needed, 2 TIMES DAILY PRN Starting Tue 6/5/2018, Historical Med      loratadine (CLARITIN) 10 MG capsule Take 1 capsule by mouth dailyHistorical Med      vitamin D (CHOLECALCIFEROL) 1000 UNIT TABS tablet Take 1,000 Units by mouth daily             Allergies     She is allergic to bee venom; fexofenadine; ibuprofen; metronidazole; shellfish-derived products; anesthetics, amide; and macrolides and ketolides.    Physical Exam     INITIAL VITALS: BP: (!) 169/98, Temp: 97.8 °F (36.6 °C), Pulse: 70, Respirations: 18, SpO2: 98 %   Physical Exam  Constitutional:       Appearance: Normal appearance.   Cardiovascular:      Rate and Rhythm: Normal rate and regular rhythm.      Heart sounds: Normal heart sounds.   Pulmonary:      Effort: No respiratory distress.      Breath sounds: Normal breath sounds. No stridor. No wheezing.   Abdominal:      General: Abdomen is flat.      Palpations: Abdomen is soft.   Musculoskeletal:      Right hand: Swelling and tenderness present. Normal range of motion.      Left hand: Normal.      Comments: Erythematous right hand    Skin:     Findings: Erythema present. No rash.      Comments: Erythema at right hand, ends 3cm above wrist   Neurological:      Mental Status: She is alert.            Sonal Rosa MD  Resident  08/10/24 [id number]       Sonal Rosa MD  Resident  08/13/24 [id number]